# Patient Record
Sex: FEMALE | Race: BLACK OR AFRICAN AMERICAN | NOT HISPANIC OR LATINO | Employment: UNEMPLOYED | URBAN - METROPOLITAN AREA
[De-identification: names, ages, dates, MRNs, and addresses within clinical notes are randomized per-mention and may not be internally consistent; named-entity substitution may affect disease eponyms.]

---

## 2023-10-03 ENCOUNTER — OFFICE VISIT (OUTPATIENT)
Dept: PRIMARY CARE | Facility: CLINIC | Age: 38
End: 2023-10-03
Payer: COMMERCIAL

## 2023-10-03 VITALS
HEIGHT: 62 IN | DIASTOLIC BLOOD PRESSURE: 80 MMHG | SYSTOLIC BLOOD PRESSURE: 132 MMHG | BODY MASS INDEX: 41.96 KG/M2 | WEIGHT: 228 LBS

## 2023-10-03 DIAGNOSIS — F43.10 PTSD (POST-TRAUMATIC STRESS DISORDER): ICD-10-CM

## 2023-10-03 DIAGNOSIS — F41.0 PANIC ATTACK: Primary | ICD-10-CM

## 2023-10-03 DIAGNOSIS — R06.02 SHORTNESS OF BREATH: ICD-10-CM

## 2023-10-03 DIAGNOSIS — F41.9 ANXIETY: ICD-10-CM

## 2023-10-03 PROCEDURE — 1036F TOBACCO NON-USER: CPT | Performed by: INTERNAL MEDICINE

## 2023-10-03 PROCEDURE — 99204 OFFICE O/P NEW MOD 45 MIN: CPT | Performed by: INTERNAL MEDICINE

## 2023-10-03 RX ORDER — FLUOXETINE HYDROCHLORIDE 40 MG/1
40 CAPSULE ORAL DAILY
COMMUNITY
End: 2023-10-03

## 2023-10-03 RX ORDER — FLUOXETINE HYDROCHLORIDE 40 MG/1
80 CAPSULE ORAL DAILY
Qty: 60 CAPSULE | Refills: 2 | Status: SHIPPED | OUTPATIENT
Start: 2023-10-03 | End: 2024-05-20 | Stop reason: DRUGHIGH

## 2023-10-03 RX ORDER — HYDROXYZINE HYDROCHLORIDE 25 MG/1
25 TABLET, FILM COATED ORAL 3 TIMES DAILY PRN
COMMUNITY

## 2023-10-03 ASSESSMENT — PATIENT HEALTH QUESTIONNAIRE - PHQ9
SUM OF ALL RESPONSES TO PHQ9 QUESTIONS 1 AND 2: 0
1. LITTLE INTEREST OR PLEASURE IN DOING THINGS: NOT AT ALL
2. FEELING DOWN, DEPRESSED OR HOPELESS: NOT AT ALL

## 2023-10-03 NOTE — PROGRESS NOTES
Subjective   Patient ID: Haylee Elizondo is a 38 y.o. female who presents for ER Follow-up, Panic Attack, Shortness of Breath, and Anxiety.    HPI   Patient is a 38-year-old -American female who comes to establish new PCP today.  She was in the ED at [facility] on 9/29/2023.  She claims she was at work when she experienced shortness of breath with lightheadedness but denies any chest pain, syncope or palpitations.  She missed her dose of fluoxetine that day but the symptoms never happened in the past when she missed her medication.  Patient claims she has been following up with psychiatry about twice a year and anxiety has been stable on current dose of fluoxetine which she takes daily as well as hydroxyzine as needed.  Patient last saw her psychiatrist about a month ago but she has apparently left the practice.  Per patient labs, EKG, chest x-ray done in the ED were unremarkable and she was apparently given fluoxetine there and discharged home.  Since then patient claims she feels better and has minimal shortness of breath.  She has been under a lot of stress lately in regards to her personal life.  She is recently  in [place] and lives with her  and 2 sons and there are no issues in the relationship with her  or her children.  However, patient claims that her  who is an only child has been caring for his elderly parents and she has been trying to help him quite a bit as well as he apparently broke his ankle not too long ago.  Patient claims that she had an incident where her father-in-law was rude to her about 4 weeks ago and this really bothers her.  She has history of posttraumatic stress related to divorce about 10 years ago.  Patient denies any headache, tingling, weakness, numbness, abdominal pain, nausea, vomiting, diarrhea, loss of weight, loss of appetite, melena, rectal bleeding or genitourinary symptoms.  She is also stressed out as they are trying to conceive as well.   "Patient is in the need for a new gynecologist as her previous one has relocated out of state.  Review of Systems  As per HPI  Patient declines the flu vaccine.  Objective   BP (!) 138/94 (BP Location: Right arm, Patient Position: Sitting, BP Cuff Size: Adult)   Ht 1.575 m (5' 2\")   Wt 103 kg (228 lb)   BMI 41.70 kg/m²     Physical Exam  General - Well developed, well appearing, morbidly obese young black female in no acute respiratory distress  Eyes - normal sclera and conjunctiva with no pallor or icterus, normal extraocular movements  ENT - normal external auditory canals and tympanic membranes, throat clear with no exudates  Neck - No JVD, thyromegaly or lymphadenopathy  Lungs - no respiratory distress and lungs clear to auscultation bilaterally  Heart - normal S1, S2 with normal heart rate, rhythm and no murmurs   Abdomen - soft, nontender with no masses or organomegaly,  Extremities - no cyanosis or pedal edema  Neuro - grossly normal neuro exam with no focal neuro deficits  Psych - normal mental status, mood and affect   Skin - no rashes or ulcers  MSK - normal gait with grossly normal ROM of major joints    Assessment/Plan     1.  Recent ED visit for panic attack-I have advised patient due to increased concurrent stress that a dose increase of the fluoxetine to 80 mg daily might be appropriate and I have provided her with a referral to see a new psychiatrist, patient will continue hydroxyzine as needed  2.  Anxiety disorder-as above fluoxetine dose will be increased and patient will see psychiatry in the near future  3.  Shortness of breath-I suspect this is secondary to panic attack but patient is concerned as she was apparently told that she \"has a hole in the heart\" when she was pregnant with her first son, and hence a 2D echo has been ordered  4.  PTSD-chronic, patient will continue fluoxetine  Follow-up on a as needed basis.  45 minutes spent reviewing records, eliciting history, examining patient, " counseling, coordination of care and in documentation  This note was partially generated using the Dragon voice recognition system. There may be some incorrect words, spelling and punctuation errors that were not corrected prior to committing the note to the patient's medical record.

## 2023-10-03 NOTE — PROGRESS NOTES
"Subjective   Patient ID: Haylee Elizondo is a 38 y.o. female who presents for ER Follow-up, Panic Attack, Shortness of Breath, and Anxiety.    HPI   Patient is a 39YO Bf who comes to establish a new PCP.    Review of Systems    Objective   /80 (BP Location: Right arm, Patient Position: Sitting, BP Cuff Size: Adult)   Ht 1.575 m (5' 2\")   Wt 103 kg (228 lb)   BMI 41.70 kg/m²     Physical Exam    Assessment/Plan          " Patient denies pain from procedure and is resting comfortably.

## 2023-10-16 ENCOUNTER — APPOINTMENT (OUTPATIENT)
Dept: PRIMARY CARE | Facility: CLINIC | Age: 38
End: 2023-10-16
Payer: COMMERCIAL

## 2023-11-10 ENCOUNTER — APPOINTMENT (OUTPATIENT)
Dept: OBSTETRICS AND GYNECOLOGY | Facility: CLINIC | Age: 38
End: 2023-11-10
Payer: COMMERCIAL

## 2023-11-12 PROBLEM — Z97.5 IUD (INTRAUTERINE DEVICE) IN PLACE: Status: ACTIVE | Noted: 2023-11-12

## 2023-11-12 PROBLEM — O09.521 MULTIGRAVIDA OF ADVANCED MATERNAL AGE IN FIRST TRIMESTER (HHS-HCC): Status: ACTIVE | Noted: 2023-11-12

## 2023-11-12 PROBLEM — R10.2 PELVIC PAIN: Status: ACTIVE | Noted: 2023-11-12

## 2023-11-12 PROBLEM — R51.9 HEADACHE: Status: ACTIVE | Noted: 2023-11-12

## 2023-11-12 PROBLEM — E66.9 OBESITY: Status: ACTIVE | Noted: 2023-11-12

## 2023-11-12 PROBLEM — R21 RASH: Status: ACTIVE | Noted: 2023-11-12

## 2023-11-12 PROBLEM — N94.19 FUNCTIONAL DYSPAREUNIA: Status: ACTIVE | Noted: 2023-11-12

## 2023-11-12 PROBLEM — E78.5 HYPERLIPIDEMIA: Status: ACTIVE | Noted: 2023-11-12

## 2023-11-12 PROBLEM — F43.10 PTSD (POST-TRAUMATIC STRESS DISORDER): Status: ACTIVE | Noted: 2023-11-12

## 2023-11-12 PROBLEM — N94.9 GENITAL SYMPTOMS, FEMALE: Status: ACTIVE | Noted: 2023-11-12

## 2023-11-12 PROBLEM — N87.0 CERVICAL DYSPLASIA, MILD: Status: ACTIVE | Noted: 2023-11-12

## 2023-11-12 PROBLEM — G43.109 MIGRAINE WITH AURA AND WITHOUT STATUS MIGRAINOSUS, NOT INTRACTABLE: Status: ACTIVE | Noted: 2023-11-12

## 2023-11-12 PROBLEM — R07.89 ATYPICAL CHEST PAIN: Status: ACTIVE | Noted: 2023-11-12

## 2023-11-12 PROBLEM — M79.18 MUSCULOSKELETAL PAIN: Status: ACTIVE | Noted: 2023-11-12

## 2023-11-12 PROBLEM — F41.1 GENERALIZED ANXIETY DISORDER: Status: ACTIVE | Noted: 2023-11-12

## 2023-11-12 PROBLEM — F41.8 DEPRESSION WITH ANXIETY: Status: ACTIVE | Noted: 2023-11-12

## 2023-11-12 PROBLEM — I10 HTN (HYPERTENSION): Status: ACTIVE | Noted: 2023-11-12

## 2023-11-12 RX ORDER — PRENATAL WITH FERROUS FUM AND FOLIC ACID 3080; 920; 120; 400; 22; 1.84; 3; 20; 10; 1; 12; 200; 27; 25; 2 [IU]/1; [IU]/1; MG/1; [IU]/1; MG/1; MG/1; MG/1; MG/1; MG/1; MG/1; UG/1; MG/1; MG/1; MG/1; MG/1
1 TABLET ORAL DAILY
COMMUNITY
Start: 2023-06-01

## 2024-02-22 ENCOUNTER — OFFICE VISIT (OUTPATIENT)
Dept: PRIMARY CARE | Facility: CLINIC | Age: 39
End: 2024-02-22
Payer: COMMERCIAL

## 2024-02-22 VITALS
SYSTOLIC BLOOD PRESSURE: 130 MMHG | DIASTOLIC BLOOD PRESSURE: 89 MMHG | BODY MASS INDEX: 41.34 KG/M2 | TEMPERATURE: 97.5 F | WEIGHT: 226 LBS | HEART RATE: 90 BPM | OXYGEN SATURATION: 98 %

## 2024-02-22 DIAGNOSIS — F43.10 PTSD (POST-TRAUMATIC STRESS DISORDER): ICD-10-CM
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
## 2024-02-22 DIAGNOSIS — M79.10 MUSCLE PAIN: ICD-10-CM

## 2024-02-22 DIAGNOSIS — E78.2 MIXED HYPERLIPIDEMIA: ICD-10-CM

## 2024-02-22 DIAGNOSIS — I10 HYPERTENSION, UNSPECIFIED TYPE: ICD-10-CM

## 2024-02-22 DIAGNOSIS — D64.9 ANEMIA, UNSPECIFIED TYPE: Primary | ICD-10-CM

## 2024-02-22 DIAGNOSIS — S37.509A: ICD-10-CM

## 2024-02-22 DIAGNOSIS — R53.83 FATIGUE, UNSPECIFIED TYPE: ICD-10-CM

## 2024-02-22 PROCEDURE — 1036F TOBACCO NON-USER: CPT | Performed by: INTERNAL MEDICINE

## 2024-02-22 PROCEDURE — 3075F SYST BP GE 130 - 139MM HG: CPT | Performed by: INTERNAL MEDICINE

## 2024-02-22 PROCEDURE — 99214 OFFICE O/P EST MOD 30 MIN: CPT | Performed by: INTERNAL MEDICINE

## 2024-02-22 PROCEDURE — 3079F DIAST BP 80-89 MM HG: CPT | Performed by: INTERNAL MEDICINE

## 2024-02-22 RX ORDER — IBUPROFEN 600 MG/1
600 TABLET ORAL 4 TIMES DAILY PRN
Qty: 90 TABLET | Refills: 1 | Status: SHIPPED | OUTPATIENT
Start: 2024-02-22 | End: 2024-04-29

## 2024-02-22 ASSESSMENT — PATIENT HEALTH QUESTIONNAIRE - PHQ9
2. FEELING DOWN, DEPRESSED OR HOPELESS: NOT AT ALL
SUM OF ALL RESPONSES TO PHQ9 QUESTIONS 1 AND 2: 0
1. LITTLE INTEREST OR PLEASURE IN DOING THINGS: NOT AT ALL

## 2024-02-22 ASSESSMENT — ENCOUNTER SYMPTOMS
LOSS OF SENSATION IN FEET: 0
DEPRESSION: 0
OCCASIONAL FEELINGS OF UNSTEADINESS: 0

## 2024-02-22 NOTE — PROGRESS NOTES
Subjective   Patient ID: Haylee Elizondo is a 38 y.o. female who presents for Establish Care (CPE, need a note to return to work.).    HPI   38 years old female for the first time in this office comes in to see me for a note for work and she would like to return to work on Tuesday next week.  She had no past medical history.  A D&C in March 2023.  Lives in [place] with her 2 sons and her  and 1 puppy.  Seasonal allergies.  Non-smoker no alcohol intake.  She works as a .  No medication prescription or over-the-counter.  She used to take her fluoxetine and hydroxyzine but stopped taking it for the last 2 weeks because she had ran out, she took it for anxiety and PTSD.  Since she stopped taking those pills she is feeling much better her vision improved and her wellbeing also.  She took those pills for years.  She went through very bad divorce 10 years ago and that because of her PTSD.  2012 for 12 years.  Her ex- cause a lot of trouble.  Now that he is out of her life, her life is much better and her children are doing much better even in school.  Her complaint also at the end of the interview she told me that on February 17 she fell at [facility] coming inside the building from a white smelly parking lot and fell on the hard floor landing on her hands hurting her knees right and left, her hips pain at the femoral areas and tailbone and low back pain.  Wonders if she needs x-rays.  Review of Systems  12 system review 12 system negative except for falling on February 17, 2024 straining and spraining both knees bilateral carotid and groins hips and low back.  History of PTSD because of bad divorce   Needs a form for work to return to work on Tuesday.  Objective   /89 (BP Location: Left arm, Patient Position: Sitting, BP Cuff Size: Large adult)   Pulse 90   Temp 36.4 °C (97.5 °F) (Temporal)   Wt 103 kg (226 lb)   SpO2 98%   BMI 41.34 kg/m²     Physical Exam  Alert  oriented in no distress.  Pleasant cooperative.  Nonicteric sclera no jaundice.  Face is symmetrical cranial nerves intact.  Neck supple no masses no lymph node no thyromegaly or jugular venous distention.  Lungs clear no rales wheezing or crackles.  Heart normal S1 and S2 regular rhythm.  Abdomen benign nontender no masses no organomegaly or pain on palpation.  Neurologically intact.  Normal speech.  Moves upper and lower extremities without limitation and equal strength.  Feeling the strain and pain on range of motion.  Her weight 226 pounds for 5.2 height  She left before I was able to draw any labs on her.  We called her back and she stated she would be back at the next visit to do it.  Assessment/Plan Diagnoses and all orders for this visit:  Anemia, unspecified type  Hypertension, unspecified type  Mixed hyperlipidemia  Fatigue, unspecified type  Muscle pain  -     ibuprofen 600 mg tablet; Take 1 tablet (600 mg) by mouth 4 times a day as needed for mild pain (1 - 3) (pain).  Traumatic injury of fallopian tube  PTSD (post-traumatic stress disorder)  Fall due to ice or snow, initial encounter

## 2024-02-22 NOTE — LETTER
February 22, 2024     Patient: Haylee Elizondo   YOB: 1985   Date of Visit: 2/22/2024       To Whom It May Concern:    Haylee Elizondo was seen in my clinic on 2/22/2024 at 1:30 pm. She may go back to work on Tuesday, February 27, 2024    If you have any questions or concerns, please don't hesitate to call.         Sincerely,         Carlos Olvera MD        CC: No Recipients

## 2024-04-15 ENCOUNTER — APPOINTMENT (OUTPATIENT)
Dept: PRIMARY CARE | Facility: CLINIC | Age: 39
End: 2024-04-15
Payer: COMMERCIAL

## 2024-04-27 DIAGNOSIS — M79.10 MUSCLE PAIN: ICD-10-CM

## 2024-04-29 RX ORDER — IBUPROFEN 600 MG/1
600 TABLET ORAL 4 TIMES DAILY PRN
Qty: 90 TABLET | Refills: 1 | Status: SHIPPED | OUTPATIENT
Start: 2024-04-29 | End: 2025-04-29

## 2024-05-20 ENCOUNTER — OFFICE VISIT (OUTPATIENT)
Dept: PRIMARY CARE | Facility: CLINIC | Age: 39
End: 2024-05-20
Payer: COMMERCIAL

## 2024-05-20 VITALS
SYSTOLIC BLOOD PRESSURE: 123 MMHG | BODY MASS INDEX: 42.34 KG/M2 | WEIGHT: 231.5 LBS | TEMPERATURE: 97.3 F | HEART RATE: 93 BPM | DIASTOLIC BLOOD PRESSURE: 85 MMHG | OXYGEN SATURATION: 97 %

## 2024-05-20 DIAGNOSIS — F41.8 DEPRESSION WITH ANXIETY: ICD-10-CM

## 2024-05-20 DIAGNOSIS — Z71.3 WEIGHT LOSS COUNSELING, ENCOUNTER FOR: Primary | ICD-10-CM

## 2024-05-20 PROCEDURE — 1036F TOBACCO NON-USER: CPT | Performed by: INTERNAL MEDICINE

## 2024-05-20 PROCEDURE — 99214 OFFICE O/P EST MOD 30 MIN: CPT | Performed by: INTERNAL MEDICINE

## 2024-05-20 PROCEDURE — 3079F DIAST BP 80-89 MM HG: CPT | Performed by: INTERNAL MEDICINE

## 2024-05-20 PROCEDURE — 3074F SYST BP LT 130 MM HG: CPT | Performed by: INTERNAL MEDICINE

## 2024-05-20 RX ORDER — FLUOXETINE HYDROCHLORIDE 20 MG/1
20 CAPSULE ORAL DAILY
Qty: 90 CAPSULE | Refills: 3 | Status: SHIPPED | OUTPATIENT
Start: 2024-05-20 | End: 2025-05-20

## 2024-05-20 ASSESSMENT — ENCOUNTER SYMPTOMS
OCCASIONAL FEELINGS OF UNSTEADINESS: 0
LOSS OF SENSATION IN FEET: 0
DEPRESSION: 0

## 2024-05-20 NOTE — PROGRESS NOTES
HeartSubjective   Patient ID: Haylee Elizondo is a 39 y.o. female who presents for Hospital Follow-up (Coughing up blood 4/3/2024 went to the ER).    HPI   39 years old female comes to see me today for a follow-up on her medical condition and to refill fluoxetine or Prozac which she stopped for a while, and now she is back on Prozac 20 mg a day.  She used to be on 80 mg.  Recently  a year ago.  Does not like the place she is working at now as a  and finally found another job where she is safer.  Unfortunately she is gaining weight from 226 pounds and February to 231.5.  She admits to drinking Coke and eating unsafe food including processed foods with salt..  We had a good talk about diet and weight loss.  She does not eat breakfast or sometimes lunch and dinner even though her diet and meals sound well she is gaining weight.  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  So we backed her to stop this habit and to switch to low carbohydrate low-fat diet.  To stay active and to walk.  In her younger age she was a walker and a  for [employer]idal[employer] , agreed on weight loss and agreed to refer her to bariatric service for maybe considering a sleeve.  I encouraged her to lose weight because I told her if she keeps gaining weight, expect to become diabetic and other complications.  Review of Systems  12 system review 12 system are negative.  Objective   /85 (BP Location: Left arm, Patient Position: Sitting, BP Cuff Size: Large adult)   Pulse 93   Temp 36.3 °C (97.3 °F) (Temporal)   Wt 105 kg (231 lb 8 oz)   SpO2 97%   BMI 42.34 kg/m²     Physical Exam  Alert oriented in no distress pleasant cooperative.  Nonicteric sclera no jaundice.  Face symmetrical cranial nerves intact.  Neck supple no masses no lymph node no thyromegaly.  Neurologically intact.  Lungs clear no rales wheezing or crackles.  Heart normal S1 and S2 regular rhythm.  Abdomen benign nontender no masses no  organomegaly.  Assessment/Plan   Diagnoses and all orders for this visit:  Weight loss counseling, encounter for  -     Referral to Bariatric Surgery; Future

## 2024-05-22 ENCOUNTER — APPOINTMENT (OUTPATIENT)
Dept: PRIMARY CARE | Facility: CLINIC | Age: 39
End: 2024-05-22
Payer: COMMERCIAL

## 2024-07-09 DIAGNOSIS — M79.10 MUSCLE PAIN: ICD-10-CM

## 2024-07-09 RX ORDER — IBUPROFEN 600 MG/1
600 TABLET ORAL 4 TIMES DAILY PRN
Qty: 90 TABLET | Refills: 1 | Status: SHIPPED | OUTPATIENT
Start: 2024-07-09 | End: 2025-07-09

## 2024-07-26 ENCOUNTER — APPOINTMENT (OUTPATIENT)
Dept: SURGERY | Facility: CLINIC | Age: 39
End: 2024-07-26
Payer: COMMERCIAL

## 2024-08-22 ENCOUNTER — APPOINTMENT (OUTPATIENT)
Dept: SURGERY | Facility: CLINIC | Age: 39
End: 2024-08-22
Payer: COMMERCIAL

## 2024-08-22 ENCOUNTER — TELEPHONE (OUTPATIENT)
Dept: SURGERY | Facility: CLINIC | Age: 39
End: 2024-08-22

## 2024-09-26 ENCOUNTER — APPOINTMENT (OUTPATIENT)
Dept: PRIMARY CARE | Facility: CLINIC | Age: 39
End: 2024-09-26
Payer: COMMERCIAL

## 2024-09-26 VITALS
WEIGHT: 237.5 LBS | SYSTOLIC BLOOD PRESSURE: 127 MMHG | DIASTOLIC BLOOD PRESSURE: 85 MMHG | HEART RATE: 99 BPM | BODY MASS INDEX: 43.44 KG/M2 | TEMPERATURE: 98.4 F | OXYGEN SATURATION: 98 %

## 2024-09-26 DIAGNOSIS — F41.8 DEPRESSION WITH ANXIETY: ICD-10-CM

## 2024-09-26 DIAGNOSIS — I10 HYPERTENSION, UNSPECIFIED TYPE: ICD-10-CM

## 2024-09-26 DIAGNOSIS — Z98.890 STATUS POST REDUCTION MAMMOPLASTY: Primary | ICD-10-CM

## 2024-09-26 DIAGNOSIS — N62 LARGE BREASTS: ICD-10-CM

## 2024-09-26 DIAGNOSIS — Z78.9 WEIGHT LOSS ADVISED: ICD-10-CM

## 2024-09-26 PROCEDURE — 3079F DIAST BP 80-89 MM HG: CPT | Performed by: INTERNAL MEDICINE

## 2024-09-26 PROCEDURE — 99214 OFFICE O/P EST MOD 30 MIN: CPT | Performed by: INTERNAL MEDICINE

## 2024-09-26 PROCEDURE — 1036F TOBACCO NON-USER: CPT | Performed by: INTERNAL MEDICINE

## 2024-09-26 PROCEDURE — 3074F SYST BP LT 130 MM HG: CPT | Performed by: INTERNAL MEDICINE

## 2024-09-26 RX ORDER — FLUOXETINE HYDROCHLORIDE 20 MG/1
20 CAPSULE ORAL DAILY
Qty: 90 CAPSULE | Refills: 3 | Status: SHIPPED | OUTPATIENT
Start: 2024-09-26 | End: 2025-09-26

## 2024-09-26 RX ORDER — SEMAGLUTIDE 0.25 MG/.5ML
0.25 INJECTION, SOLUTION SUBCUTANEOUS WEEKLY
Qty: 2 ML | Refills: 0 | Status: SHIPPED | OUTPATIENT
Start: 2024-09-26 | End: 2024-10-24

## 2024-09-26 RX ORDER — HYDROXYZINE HYDROCHLORIDE 25 MG/1
25 TABLET, FILM COATED ORAL 3 TIMES DAILY PRN
Qty: 60 TABLET | Refills: 1 | Status: SHIPPED | OUTPATIENT
Start: 2024-09-26

## 2024-09-26 ASSESSMENT — PATIENT HEALTH QUESTIONNAIRE - PHQ9
2. FEELING DOWN, DEPRESSED OR HOPELESS: NOT AT ALL
1. LITTLE INTEREST OR PLEASURE IN DOING THINGS: NOT AT ALL
SUM OF ALL RESPONSES TO PHQ9 QUESTIONS 1 AND 2: 0

## 2024-09-26 ASSESSMENT — ENCOUNTER SYMPTOMS
DEPRESSION: 0
OCCASIONAL FEELINGS OF UNSTEADINESS: 0
LOSS OF SENSATION IN FEET: 0

## 2024-09-26 ASSESSMENT — PAIN SCALES - GENERAL: PAINLEVEL: 4

## 2024-09-26 NOTE — PROGRESS NOTES
Subjective   Patient ID: Haylee Bill is a 39 y.o. female who presents for Back Pain (Lower back, heavy breast), Shoulder Pain (Painful bra strapes), and Med Refill.    HPI   49 years old female comes in to see me today to review her medical condition.  She is living with her new  in [place] 2 sons.  Working  different company.  Much happier.  Gained weight instead of losing weight unfortunately.  Up to 237.5 pounds.  She is trying to get pregnant.  I advised her to lose weight before doing so she need to be closer to 200 pounds and then get pregnant.  Will discuss diet and educate her about diet low carbohydrate low-fat diet which I repeatedly explained to her twice so far.  Also mentioned try Wegovy if it is approved by her insurance.  She also asking for breast reduction.  Needs refill on her Prozac and hydroxyzine  Review of Systems  12 system review 12 system negative.  Objective   /85 (BP Location: Left arm, Patient Position: Sitting, BP Cuff Size: Adult)   Pulse 99   Temp 36.9 °C (98.4 °F) (Temporal)   Wt 108 kg (237 lb 8 oz)   SpO2 98%   BMI 43.44 kg/m²     Physical Exam  Alert oriented no distress.  Nonicteric sclera no jaundice.  Neck supple no masses no lymph node no thyromegaly or jugular venous distention.  Lungs clear no rales wheezing or crackles.  Heart normal S1 and S2 regular rhythm.  Abdomen benign neurologically intact no swelling in the lower extremities.  Lose weight by dieting and have a goal by the end of this year December 2024 he have to lose on a minimum of 30 to 40 pounds by diet and low-fat low carbohydrate diet.  Exercise when you can.  Referral for breast reduction plastic surgeon provided Dr. Theresa Vann.  We are checking her BMP TSH and lipid panel.  Referral to bariatric services provided again.  Assessment/Plan   Diagnoses and all orders for this visit:  Status post reduction mammoplasty  -     Referral to Breast Surgery; Future  Weight loss  advised  -     semaglutide, weight loss, (Wegovy) 0.25 mg/0.5 mL pen injector; Inject 0.25 mg under the skin 1 (one) time per week for 4 doses.  Large breasts  -     Referral to Plastic Surgery; Future  Depression with anxiety  Hypertension, unspecified type